# Patient Record
Sex: MALE | Race: WHITE | NOT HISPANIC OR LATINO | Employment: UNEMPLOYED | URBAN - NONMETROPOLITAN AREA
[De-identification: names, ages, dates, MRNs, and addresses within clinical notes are randomized per-mention and may not be internally consistent; named-entity substitution may affect disease eponyms.]

---

## 2024-08-21 ENCOUNTER — HOSPITAL ENCOUNTER (EMERGENCY)
Facility: HOSPITAL | Age: 29
Discharge: HOME | End: 2024-08-21
Attending: FAMILY MEDICINE
Payer: COMMERCIAL

## 2024-08-21 VITALS
HEART RATE: 97 BPM | RESPIRATION RATE: 18 BRPM | BODY MASS INDEX: 27.09 KG/M2 | SYSTOLIC BLOOD PRESSURE: 126 MMHG | OXYGEN SATURATION: 99 % | WEIGHT: 200 LBS | HEIGHT: 72 IN | DIASTOLIC BLOOD PRESSURE: 71 MMHG | TEMPERATURE: 98.4 F

## 2024-08-21 DIAGNOSIS — L08.9 DOG BITE OF LEFT HAND INCLUDING FINGERS WITH INFECTION, INITIAL ENCOUNTER: ICD-10-CM
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
## 2024-08-21 DIAGNOSIS — S61.452A DOG BITE OF LEFT HAND, INITIAL ENCOUNTER: Primary | ICD-10-CM

## 2024-08-21 DIAGNOSIS — S61.259A DOG BITE OF LEFT HAND INCLUDING FINGERS WITH INFECTION, INITIAL ENCOUNTER: ICD-10-CM
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
## 2024-08-21 DIAGNOSIS — S61.452A DOG BITE OF LEFT HAND INCLUDING FINGERS WITH INFECTION, INITIAL ENCOUNTER: ICD-10-CM

## 2024-08-21 PROCEDURE — 2500000001 HC RX 250 WO HCPCS SELF ADMINISTERED DRUGS (ALT 637 FOR MEDICARE OP): Performed by: FAMILY MEDICINE

## 2024-08-21 PROCEDURE — 99283 EMERGENCY DEPT VISIT LOW MDM: CPT

## 2024-08-21 RX ORDER — METRONIDAZOLE 500 MG/1
500 TABLET ORAL 3 TIMES DAILY
Qty: 21 TABLET | Refills: 0 | Status: SHIPPED | OUTPATIENT
Start: 2024-08-21 | End: 2024-08-28

## 2024-08-21 RX ORDER — CLINDAMYCIN HYDROCHLORIDE 300 MG/1
300 CAPSULE ORAL 3 TIMES DAILY
Qty: 30 CAPSULE | Refills: 0 | Status: SHIPPED | OUTPATIENT
Start: 2024-08-21 | End: 2024-08-31

## 2024-08-21 RX ORDER — DEXTROAMPHETAMINE SACCHARATE, AMPHETAMINE ASPARTATE MONOHYDRATE, DEXTROAMPHETAMINE SULFATE AND AMPHETAMINE SULFATE 3.75; 3.75; 3.75; 3.75 MG/1; MG/1; MG/1; MG/1
1 CAPSULE, EXTENDED RELEASE ORAL
COMMUNITY
Start: 2024-07-25

## 2024-08-21 RX ORDER — CLINDAMYCIN HYDROCHLORIDE 150 MG/1
300 CAPSULE ORAL ONCE
Status: COMPLETED | OUTPATIENT
Start: 2024-08-21 | End: 2024-08-21

## 2024-08-21 RX ORDER — METRONIDAZOLE 250 MG/1
500 TABLET ORAL ONCE
Status: COMPLETED | OUTPATIENT
Start: 2024-08-21 | End: 2024-08-21

## 2024-08-21 ASSESSMENT — PAIN - FUNCTIONAL ASSESSMENT: PAIN_FUNCTIONAL_ASSESSMENT: 0-10

## 2024-08-21 ASSESSMENT — PAIN DESCRIPTION - LOCATION: LOCATION: HAND

## 2024-08-21 ASSESSMENT — PAIN SCALES - GENERAL: PAINLEVEL_OUTOF10: 3

## 2024-08-21 ASSESSMENT — COLUMBIA-SUICIDE SEVERITY RATING SCALE - C-SSRS
2. HAVE YOU ACTUALLY HAD ANY THOUGHTS OF KILLING YOURSELF?: NO
1. IN THE PAST MONTH, HAVE YOU WISHED YOU WERE DEAD OR WISHED YOU COULD GO TO SLEEP AND NOT WAKE UP?: NO
6. HAVE YOU EVER DONE ANYTHING, STARTED TO DO ANYTHING, OR PREPARED TO DO ANYTHING TO END YOUR LIFE?: NO

## 2024-08-21 ASSESSMENT — PAIN DESCRIPTION - PAIN TYPE: TYPE: ACUTE PAIN

## 2024-08-22 NOTE — ED NOTES
Patient was bitten by his own dog on Saturday. 3 healed abrasions, puncture wounds present on left hand. 3/10 pain. Hand has swelling, redness and is hot covering most of the anterior part of the hand.     Lester Davis RN  08/21/24 4171

## 2024-08-22 NOTE — ED PROVIDER NOTES
HPI   Chief Complaint   Patient presents with    Animal Bite       HPI  This 29-year-old male patient came to the emergency room complaint of dog bite to left hand with redness swelling and actually more redness in the dorsal hand than swelling with 2 puncture spots dog belonged to his wife the dog shots are up-to-date.  They have multiple dogs in the house.  Patient stated that his tetanus shot is up-to-date.  Dog bite occurred 5 days ago and due to redness and slight swelling dorsal hand with marked erythema without any difficulty moving his finger he decided come to but no difficulty moving finger.  Distal erythema and infection in the skin decided come to ER for evaluation tonight.  Denies any fever chills cough nausea vomiting diarrhea.  Denies weakness handgrip or to move his finger denies any elbow or wrist pain or swelling of the joint in the elbow wrist or fingers.  He is he denies any nausea vomiting or diarrhea.  Patient is right-handed.      Family history: Reviewed  Social history: Reviewed, denies substance abuse.  Review of system: 10 review of system obtained review of system described in HPI otherwise negative.      Patient History   Past Medical History:   Diagnosis Date    Chlamydial infection, unspecified 10/24/2013    Disease due to chlamydiae    Encounter for screening for lipoid disorders 09/16/2013    Encounter for screening for lipoid disorders    Noninfective gastroenteritis and colitis, unspecified 09/16/2013    Chronic diarrhea of unknown origin     History reviewed. No pertinent surgical history.  No family history on file.  Social History     Tobacco Use    Smoking status: Never    Smokeless tobacco: Never   Substance Use Topics    Alcohol use: Not on file    Drug use: Not on file       Physical Exam   ED Triage Vitals [08/21/24 2233]   Temperature Heart Rate Respirations BP   37 °C (98.6 °F) 98 18 136/74      SpO2 Temp src Heart Rate Source Patient Position   98 % -- -- --      BP  Location FiO2 (%)     -- --       Physical Exam  Constitutional:       Appearance: Normal appearance.      Comments: Marked erythema noted to dorsal left hand with some extending from the metacarpal to the wrist area without involving the DIP joint or MP joint.  Able to flex and extend all 5 digits good capillary refill to functioning intact sensation.  Intact radial pulses.  No red streaking forearm 2 puncture spot noted on dorsal hand.  No abscess or drainage no significant edema was noted to roby erythema dorsum of left hand.  Intact radial pulse intact sensation.    No facial bruise edema erythema head was normocephalic atraumatic talking breathing comfortably patient did not look sick toxic distress.  Alert oriented x 3.   HENT:      Head: Normocephalic and atraumatic.      Right Ear: Ear canal and external ear normal.      Left Ear: Ear canal and external ear normal.      Nose: Nose normal. No congestion or rhinorrhea.      Mouth/Throat:      Mouth: Mucous membranes are moist.   Eyes:      Extraocular Movements: Extraocular movements intact.      Conjunctiva/sclera: Conjunctivae normal.      Pupils: Pupils are equal, round, and reactive to light.   Cardiovascular:      Rate and Rhythm: Normal rate and regular rhythm.      Pulses: Normal pulses.      Heart sounds: Normal heart sounds.      Comments: No tachycardia regular rate rhythm.  No friction rub no murmur no JVD good capillary refill at tip of finger including involved hand intact radial pulses.  Calf muscle nontender Homans' sign negative bilaterally.  Pulmonary:      Effort: Pulmonary effort is normal.      Breath sounds: Normal breath sounds.      Comments: Chest wall nontender clear lung sounds bilaterally with no wheezing rales rhonchi no tachypnea hypoxemia respite distress.  Good skin perfusion  Abdominal:      General: Abdomen is flat. Bowel sounds are normal.      Palpations: Abdomen is soft.      Comments: Abdomen soft positive bowel sound  nontender no guarding rebound rigidity.  No CVA tenderness noted.  Spine nontender neck is supple.   Musculoskeletal:      Cervical back: Normal range of motion and neck supple.      Comments: Marked erythema noted to dorsal left hand with some extending from the metacarpal to the wrist area without involving the DIP joint or MP joint.  Able to flex and extend all 5 digits good capillary refill to functioning intact sensation.  Intact radial pulses.  No red streaking forearm 2 puncture spot noted on dorsal hand.  No abscess or drainage no significant edema was noted to roby erythema dorsum of left hand.  Intact radial pulse intact sensation.  Good range of motion arms alert good motor strength both upper lower extremity good radial pulses intact sensation neck is supple spine nontender no facial bruise edema Manda or tenderness noted.    No facial bruise edema erythema head was normocephalic atraumatic talking breathing comfortably patient did not look sick toxic distress.  Alert oriented x 3.   Skin:     General: Skin is warm.      Capillary Refill: Capillary refill takes less than 2 seconds.      Comments: Marked erythema noted to dorsal left hand with some extending from the metacarpal to the wrist area without involving the DIP joint or MP joint.  Able to flex and extend all 5 digits good capillary refill to functioning intact sensation.  Intact radial pulses.  No red streaking forearm 2 puncture spot noted on dorsal hand.  No abscess or drainage no significant edema was noted to roby erythema dorsum of left hand.  Intact radial pulse intact sensation.  Good range of motion arms alert good motor strength both upper lower extremity good radial pulses intact sensation neck is supple spine nontender no facial bruise edema Manda or tenderness noted.    No facial bruise edema erythema head was normocephalic atraumatic talking breathing comfortably patient did not look sick toxic distress.  Alert oriented x 3.    Neurological:      General: No focal deficit present.      Mental Status: He is alert and oriented to person, place, and time.   Psychiatric:         Mood and Affect: Mood normal.         Behavior: Behavior normal.           ED Course & MDM   Diagnoses as of 09/07/24 0145   Dog bite of left hand, initial encounter   Dog bite of left hand including fingers with infection, initial encounter   This 29-year-old male patient came to the emergency room complaint of dog bite to left hand with redness swelling and actually more redness in the dorsal hand than swelling with 2 puncture spots dog belonged to his wife the dog shots are up-to-date.  They have multiple dogs in the house.  Patient stated that his tetanus shot is up-to-date.  Dog bite occurred 5 days ago and due to redness and slight swelling dorsal hand with marked erythema without any difficulty moving his finger he decided come to but no difficulty moving finger.  Distal erythema and infection in the skin decided come to ER for evaluation tonight.  Denies any fever chills cough nausea vomiting diarrhea.  Denies weakness handgrip or to move his finger denies any elbow or wrist pain or swelling of the joint in the elbow wrist or fingers.  He is he denies any nausea vomiting or diarrhea.  Patient is right-handed.     Marked erythema noted to dorsal left hand with some extending from the metacarpal to the wrist area without involving the DIP joint or MP joint.  Able to flex and extend all 5 digits good capillary refill to functioning intact sensation.  Intact radial pulses.  No red streaking forearm 2 puncture spot noted on dorsal hand.  No abscess or drainage no significant edema was noted to roby erythema dorsum of left hand.  Intact radial pulse intact sensation.    No facial bruise edema erythema head was normocephalic atraumatic talking breathing comfortably patient did not look sick toxic distress.  Alert oriented x 3.         No data recorded                                  Medical Decision Making    Due to patient's significant infection I discussed treatment option the patient he did not want to have workup done he want to be treated immediately he is allergic to penicillin as result he was put on clindamycin and metronidazole next regiment of recommended treatment with close follow-up.  If any problem concern return to ER.  Patient understood instruction well also understood risk and benefit well problems return to ER if any problem concern close follow-up as recommended.  Procedure  Procedures     Yani Briggs MD  08/21/24 2321       Yani Briggs MD  08/21/24 2325       Yani Briggs MD  09/07/24 0149

## 2024-08-22 NOTE — DISCHARGE INSTRUCTIONS
As discussed if any worsening of symptoms swelling increased redness stiffness of finger report to [facility] emergency room immediately for evaluation by hand specialist and infectious disease specialist.  Since you are allergic to redness penicillin you been put on second line medicine of clindamycin and Flagyl patient to take medicine as prescribed.  You need to be reevaluated in 48 hours.  See your primary care physician or return to ER for evaluation.  Once again if any worsening symptoms report to ER at [facility] Main treatment [facility] immediately.

## 2025-02-22 ENCOUNTER — APPOINTMENT (OUTPATIENT)
Dept: RADIOLOGY | Facility: HOSPITAL | Age: 30
End: 2025-02-22
Payer: COMMERCIAL

## 2025-02-22 ENCOUNTER — HOSPITAL ENCOUNTER (EMERGENCY)
Facility: HOSPITAL | Age: 30
Discharge: HOME | End: 2025-02-23
Attending: STUDENT IN AN ORGANIZED HEALTH CARE EDUCATION/TRAINING PROGRAM
Payer: COMMERCIAL

## 2025-02-22 VITALS
OXYGEN SATURATION: 98 % | RESPIRATION RATE: 16 BRPM | TEMPERATURE: 97.9 F | HEIGHT: 72 IN | SYSTOLIC BLOOD PRESSURE: 127 MMHG | WEIGHT: 185.63 LBS | HEART RATE: 98 BPM | DIASTOLIC BLOOD PRESSURE: 78 MMHG | BODY MASS INDEX: 25.14 KG/M2

## 2025-02-22 DIAGNOSIS — I86.1 LEFT VARICOCELE: ICD-10-CM

## 2025-02-22 DIAGNOSIS — N50.812 LEFT TESTICULAR PAIN: Primary | ICD-10-CM

## 2025-02-22 LAB
AMORPH CRY #/AREA UR COMP ASSIST: NORMAL /HPF
APPEARANCE UR: ABNORMAL
BILIRUB UR STRIP.AUTO-MCNC: NEGATIVE MG/DL
COLOR UR: ABNORMAL
GLUCOSE UR STRIP.AUTO-MCNC: NORMAL MG/DL
KETONES UR STRIP.AUTO-MCNC: NEGATIVE MG/DL
LEUKOCYTE ESTERASE UR QL STRIP.AUTO: NEGATIVE
MUCOUS THREADS #/AREA URNS AUTO: NORMAL /LPF
NITRITE UR QL STRIP.AUTO: NEGATIVE
PH UR STRIP.AUTO: 6 [PH]
PROT UR STRIP.AUTO-MCNC: ABNORMAL MG/DL
RBC # UR STRIP.AUTO: NEGATIVE MG/DL
RBC #/AREA URNS AUTO: NORMAL /HPF
SP GR UR STRIP.AUTO: 1.03
UROBILINOGEN UR STRIP.AUTO-MCNC: ABNORMAL MG/DL
WBC #/AREA URNS AUTO: NORMAL /HPF

## 2025-02-22 PROCEDURE — 76870 US EXAM SCROTUM: CPT | Performed by: SURGERY

## 2025-02-22 PROCEDURE — 99284 EMERGENCY DEPT VISIT MOD MDM: CPT | Mod: 25 | Performed by: STUDENT IN AN ORGANIZED HEALTH CARE EDUCATION/TRAINING PROGRAM

## 2025-02-22 PROCEDURE — 81001 URINALYSIS AUTO W/SCOPE: CPT | Performed by: STUDENT IN AN ORGANIZED HEALTH CARE EDUCATION/TRAINING PROGRAM

## 2025-02-22 PROCEDURE — 76870 US EXAM SCROTUM: CPT

## 2025-02-22 ASSESSMENT — COLUMBIA-SUICIDE SEVERITY RATING SCALE - C-SSRS
6. HAVE YOU EVER DONE ANYTHING, STARTED TO DO ANYTHING, OR PREPARED TO DO ANYTHING TO END YOUR LIFE?: NO
1. IN THE PAST MONTH, HAVE YOU WISHED YOU WERE DEAD OR WISHED YOU COULD GO TO SLEEP AND NOT WAKE UP?: NO
2. HAVE YOU ACTUALLY HAD ANY THOUGHTS OF KILLING YOURSELF?: NO

## 2025-02-22 ASSESSMENT — PAIN DESCRIPTION - DESCRIPTORS: DESCRIPTORS: DISCOMFORT

## 2025-02-22 ASSESSMENT — PAIN DESCRIPTION - PAIN TYPE: TYPE: ACUTE PAIN

## 2025-02-22 ASSESSMENT — PAIN DESCRIPTION - LOCATION: LOCATION: GROIN

## 2025-02-22 ASSESSMENT — PAIN DESCRIPTION - FREQUENCY: FREQUENCY: CONSTANT/CONTINUOUS

## 2025-02-22 ASSESSMENT — PAIN - FUNCTIONAL ASSESSMENT: PAIN_FUNCTIONAL_ASSESSMENT: 0-10

## 2025-02-22 ASSESSMENT — PAIN SCALES - GENERAL: PAINLEVEL_OUTOF10: 2

## 2025-02-22 ASSESSMENT — PAIN DESCRIPTION - ONSET: ONSET: SUDDEN

## 2025-02-22 ASSESSMENT — PAIN DESCRIPTION - PROGRESSION: CLINICAL_PROGRESSION: NOT CHANGED

## 2025-02-23 PROCEDURE — 2500000004 HC RX 250 GENERAL PHARMACY W/ HCPCS (ALT 636 FOR OP/ED): Performed by: STUDENT IN AN ORGANIZED HEALTH CARE EDUCATION/TRAINING PROGRAM

## 2025-02-23 PROCEDURE — 96372 THER/PROPH/DIAG INJ SC/IM: CPT | Performed by: STUDENT IN AN ORGANIZED HEALTH CARE EDUCATION/TRAINING PROGRAM

## 2025-02-23 RX ORDER — KETOROLAC TROMETHAMINE 30 MG/ML
30 INJECTION, SOLUTION INTRAMUSCULAR; INTRAVENOUS ONCE
Status: COMPLETED | OUTPATIENT
Start: 2025-02-23 | End: 2025-02-23

## 2025-02-23 RX ORDER — KETOROLAC TROMETHAMINE 10 MG/1
10 TABLET, FILM COATED ORAL EVERY 6 HOURS PRN
Qty: 20 TABLET | Refills: 0 | Status: SHIPPED | OUTPATIENT
Start: 2025-02-23 | End: 2025-02-28

## 2025-02-23 RX ADMIN — KETOROLAC TROMETHAMINE 30 MG: 30 INJECTION, SOLUTION INTRAMUSCULAR at 01:04

## 2025-02-23 NOTE — DISCHARGE INSTRUCTIONS
Follow-up with urology Dr. Messina using the information provided to schedule an appointment with his office regarding varicocele found on your ultrasound today.  There is no evidence of any infection or anything that would need an emergent surgery for further management, you can use Toradol and Tylenol as needed for pain control, wear more supportive underwear which can help to reduce tension to the area as well give some relief.  Return to the ED for any new or worsening symptoms including severe worsening of pain, fevers, spreading redness around the skin to the affected area which could be signs of local infection and require antibiotic treatment.

## 2025-02-24 NOTE — ED PROVIDER NOTES
"HPI   Chief Complaint   Patient presents with    Groin Pain     Pt started to have some lower abdominal/groin pain after \"moving wrong\" at work a few days ago. Pt also having some pain in his left testicle.       This is a 30-year-old male presenting the ED for evaluation of left groin and testicular pain.  He states that the symptoms started after \"moving wrong\" at work a few days ago.  He feels that there may be a slight bulge at the left testicle when at the groin just above this area, he is never noticed the symptoms in the past.  He denies any penile discharge, dysuria or hematuria, difficulty urinating.  He has 1 sexual partner and his wife, denies any concern for new partners or STI exposure.  He denies any rash or penile, scrotal lesions.      History provided by:  Patient   used: No            Patient History   Past Medical History:   Diagnosis Date    Chlamydial infection, unspecified 10/24/2013    Disease due to chlamydiae    Encounter for screening for lipoid disorders 09/16/2013    Encounter for screening for lipoid disorders    Noninfective gastroenteritis and colitis, unspecified 09/16/2013    Chronic diarrhea of unknown origin     History reviewed. No pertinent surgical history.  No family history on file.  Social History     Tobacco Use    Smoking status: Never    Smokeless tobacco: Never   Substance Use Topics    Alcohol use: Not on file    Drug use: Not on file       Physical Exam   ED Triage Vitals [02/22/25 2254]   Temperature Heart Rate Respirations BP   36.6 °C (97.9 °F) 98 16 127/78      Pulse Ox Temp Source Heart Rate Source Patient Position   98 % Temporal Monitor Sitting      BP Location FiO2 (%)     Right arm --       Physical Exam  General: well developed, well nourished adult male who is awake and alert, in no apparent distress  CV: regular rate and rhythm, no murmur, no gallops, or rubs.   Resp: clear to ascultation bilaterally, no wheezes, rales, or rhonchi  GI: " abdomen soft, nontender without rigidity or guarding, no peritoneal signs, abdomen is nondistended, no masses palpated  : No genital lesions, mildly tender to palpation over the left testicle, no epididymal tenderness, no palpable inguinal hernia present  Psych: appropriate mood and affect, cooperative with exam  Skin: warm, dry, without evidence of rash or abrasions    ED Course & MDM   Diagnoses as of 02/24/25 0349   Left testicular pain   Left varicocele                 No data recorded                                 Medical Decision Making  He is in no acute distress here, he presents for left testicular and groin pain for the past few days.  Urinalysis shows no UTI, ultrasound of the testicles was performed in the ED showing good blood flow, no evidence of testicular torsion.  He does have a left varicocele present, no hydrocele or visible hernia.  He has no palpable hernia on exam.  No external penile lesions or scrotal lesions.  No symptoms suggestive of STI and no concern for exposure necessitating further testing.  He was treated with Toradol with some improvement of his pain.  He was referred to a urologist for further management of varicocele as an outpatient.  He was discharged in improved condition.    US scrotum w doppler   Final Result   Left-sided varicocele.There is distention of the pampiniform plexus   with Valsalva in the right hemiscrotum but no definite varicocele.        Normal testes and epididymides.        MACRO:   None        Signed by: Michael Oneal 2/23/2025 12:56 AM   Dictation workstation:   [id number]        Labs Reviewed   URINALYSIS WITH REFLEX CULTURE AND MICROSCOPIC - Abnormal       Result Value    Color, Urine Light-Orange (*)     Appearance, Urine Turbid (*)     Specific Gravity, Urine 1.029      pH, Urine 6.0      Protein, Urine 10 (TRACE)      Glucose, Urine Normal      Blood, Urine NEGATIVE      Ketones, Urine NEGATIVE      Bilirubin, Urine NEGATIVE      Urobilinogen, Urine  2 (1+) (*)     Nitrite, Urine NEGATIVE      Leukocyte Esterase, Urine NEGATIVE     URINALYSIS WITH REFLEX CULTURE AND MICROSCOPIC    Narrative:     The following orders were created for panel order Urinalysis with Reflex Culture and Microscopic.  Procedure                               Abnormality         Status                     ---------                               -----------         ------                     Urinalysis with Reflex C...[[id number]]  Abnormal            Final result               Extra Urine Gray Tube[[id number]]                                                         Please view results for these tests on the individual orders.   EXTRA URINE GRAY TUBE   URINALYSIS MICROSCOPIC WITH REFLEX CULTURE    WBC, Urine 1-5      RBC, Urine 1-2      Mucus, Urine FEW      Amorphous Crystals, Urine 1+           Procedure  Procedures     Asher Pelayo DO  02/24/25 2615

## 2025-03-19 ENCOUNTER — HOSPITAL ENCOUNTER (EMERGENCY)
Facility: HOSPITAL | Age: 30
Discharge: HOME | End: 2025-03-19
Attending: FAMILY MEDICINE
Payer: COMMERCIAL

## 2025-03-19 VITALS
SYSTOLIC BLOOD PRESSURE: 144 MMHG | BODY MASS INDEX: 25.44 KG/M2 | HEART RATE: 87 BPM | DIASTOLIC BLOOD PRESSURE: 84 MMHG | TEMPERATURE: 98 F | OXYGEN SATURATION: 99 % | WEIGHT: 187.83 LBS | HEIGHT: 72 IN | RESPIRATION RATE: 17 BRPM

## 2025-03-19 DIAGNOSIS — R21 GROIN RASH: Primary | ICD-10-CM

## 2025-03-19 DIAGNOSIS — L25.9 SHAVING IRRITATION: ICD-10-CM

## 2025-03-19 PROCEDURE — 99283 EMERGENCY DEPT VISIT LOW MDM: CPT | Performed by: FAMILY MEDICINE

## 2025-03-19 PROCEDURE — 99282 EMERGENCY DEPT VISIT SF MDM: CPT

## 2025-03-19 RX ORDER — HYDROCORTISONE 25 MG/G
CREAM TOPICAL 2 TIMES DAILY
Qty: 20 G | Refills: 0 | Status: SHIPPED | OUTPATIENT
Start: 2025-03-19 | End: 2025-03-26

## 2025-03-19 ASSESSMENT — PAIN SCALES - GENERAL
PAINLEVEL_OUTOF10: 1
PAINLEVEL_OUTOF10: 1

## 2025-03-19 ASSESSMENT — PAIN DESCRIPTION - PROGRESSION: CLINICAL_PROGRESSION: NOT CHANGED

## 2025-03-19 ASSESSMENT — COLUMBIA-SUICIDE SEVERITY RATING SCALE - C-SSRS
6. HAVE YOU EVER DONE ANYTHING, STARTED TO DO ANYTHING, OR PREPARED TO DO ANYTHING TO END YOUR LIFE?: NO
2. HAVE YOU ACTUALLY HAD ANY THOUGHTS OF KILLING YOURSELF?: NO
1. IN THE PAST MONTH, HAVE YOU WISHED YOU WERE DEAD OR WISHED YOU COULD GO TO SLEEP AND NOT WAKE UP?: NO

## 2025-03-19 ASSESSMENT — PAIN - FUNCTIONAL ASSESSMENT: PAIN_FUNCTIONAL_ASSESSMENT: 0-10

## 2025-03-19 ASSESSMENT — PAIN DESCRIPTION - LOCATION: LOCATION: GROIN

## 2025-03-19 ASSESSMENT — PAIN DESCRIPTION - ORIENTATION: ORIENTATION: RIGHT

## 2025-03-19 NOTE — ED PROVIDER NOTES
HPI   Chief Complaint   Patient presents with    Groin Pain       30-year-old male comes the ED with complaint of itchiness across his belly that he noted earlier today.  Patient reports he was lowered down by his groin on both sides.  Patient reports that it is not painful and just itches at that area and not sure where it came from but did not have it evaluated by his primary care doctor and came to the ED to assess it.  Patient in the ED is alert, cooperative, appears comfortable, and in no distress.  Patient reports no other associate symptoms or complaints at this time.      History provided by:  Medical records and patient   used: No            Patient History   Past Medical History:   Diagnosis Date    Chlamydial infection, unspecified 10/24/2013    Disease due to chlamydiae    Encounter for screening for lipoid disorders 09/16/2013    Encounter for screening for lipoid disorders    Noninfective gastroenteritis and colitis, unspecified 09/16/2013    Chronic diarrhea of unknown origin     History reviewed. No pertinent surgical history.  No family history on file.  Social History     Tobacco Use    Smoking status: Never    Smokeless tobacco: Never   Vaping Use    Vaping status: Every Day   Substance Use Topics    Alcohol use: Not on file    Drug use: Never       Physical Exam   ED Triage Vitals [03/19/25 0154]   Temperature Heart Rate Respirations BP   36.4 °C (97.5 °F) 91 18 144/84      SpO2 Temp src Heart Rate Source Patient Position   98 % -- -- --      BP Location FiO2 (%)     -- --       Physical Exam  Vitals and nursing note reviewed.   Constitutional:       General: He is not in acute distress.     Appearance: He is well-developed.   HENT:      Head: Normocephalic and atraumatic.   Eyes:      Conjunctiva/sclera: Conjunctivae normal.   Cardiovascular:      Rate and Rhythm: Normal rate and regular rhythm.      Pulses: Normal pulses.      Heart sounds: Normal heart sounds, S1 normal and  S2 normal. No murmur heard.  Pulmonary:      Effort: Pulmonary effort is normal. No respiratory distress.      Breath sounds: Normal breath sounds.   Abdominal:      General: Abdomen is flat.      Palpations: Abdomen is soft.      Tenderness: There is no abdominal tenderness.   Musculoskeletal:         General: No swelling.      Cervical back: Neck supple.   Skin:     General: Skin is warm and dry.      Capillary Refill: Capillary refill takes less than 2 seconds.      Findings: Rash present. Rash is macular and papular.             Comments: 2 small area patches of skin near the groin area appear to be maculopapular rash adjacent to an area where patient had shaved that appears to be consistent with a skin irritation/razor burn   Neurological:      Mental Status: He is alert.   Psychiatric:         Mood and Affect: Mood normal.           ED Course & MDM   Diagnoses as of 03/19/25 0647   Groin rash   Shaving irritation                 No data recorded     [place] Coma Scale Score: 15 (03/19/25 0155 : Leslie Crenshaw RN)                           Medical Decision Making  Pt upon arrival to the ED appeared to be comfortable and in no distress with stable vitals.  Discussed with pt the presenting complaints and clinically findings.  Reviewed with pt the epic chart and counseled the patient on rashes and appropriate approach to management/treatments.  After assessment and evaluation findings of his exam appear to be consistent with a skin rash secondary to possible razor burn foot area that patient shaved down by his groin.  Recommendations were made to the patient regarding management and patient is given prescription for home and educated in its appropriate use.  Patient was encouraged to contact his primary care doctor follow-up in neck several days for recheck for resolution of the rash.  Patient stable to time and discharged home.    Amount and/or Complexity of Data Reviewed  External Data Reviewed: labs, radiology  and notes.    Risk  OTC drugs.  Prescription drug management.        Procedure  Procedures     Mic Teran MD  03/19/25 0649

## 2025-03-19 NOTE — ED TRIAGE NOTES
Pt to ED for reports of right lower abd/groin pain and redness since yesterday. States he noticed it after he got out of the shower after the gym and states he also has a physical job.

## 2025-03-21 ENCOUNTER — OFFICE VISIT (OUTPATIENT)
Dept: URGENT CARE | Facility: URGENT CARE | Age: 30
End: 2025-03-21
Payer: COMMERCIAL

## 2025-03-21 VITALS
HEIGHT: 72 IN | DIASTOLIC BLOOD PRESSURE: 82 MMHG | OXYGEN SATURATION: 96 % | SYSTOLIC BLOOD PRESSURE: 129 MMHG | RESPIRATION RATE: 16 BRPM | BODY MASS INDEX: 25.08 KG/M2 | HEART RATE: 97 BPM | TEMPERATURE: 98.1 F | WEIGHT: 185.19 LBS

## 2025-03-21 DIAGNOSIS — I86.1 VARICOCELE: ICD-10-CM

## 2025-03-21 DIAGNOSIS — N41.0 ACUTE PROSTATITIS: ICD-10-CM

## 2025-03-21 DIAGNOSIS — R30.0 BURNING WITH URINATION: Primary | ICD-10-CM

## 2025-03-21 DIAGNOSIS — Z11.3 SCREEN FOR STD (SEXUALLY TRANSMITTED DISEASE): ICD-10-CM

## 2025-03-21 LAB
POC APPEARANCE, URINE: CLEAR
POC BILIRUBIN, URINE: NEGATIVE
POC BLOOD, URINE: NEGATIVE
POC COLOR, URINE: YELLOW
POC GLUCOSE, URINE: NEGATIVE MG/DL
POC KETONES, URINE: NEGATIVE MG/DL
POC LEUKOCYTES, URINE: NEGATIVE
POC NITRITE,URINE: NEGATIVE
POC PH, URINE: 7 PH
POC PROTEIN, URINE: NEGATIVE MG/DL
POC SPECIFIC GRAVITY, URINE: 1.01
POC UROBILINOGEN, URINE: 0.2 EU/DL

## 2025-03-21 RX ORDER — TADALAFIL 5 MG/1
5 TABLET ORAL
COMMUNITY
Start: 2025-03-18 | End: 2025-04-17

## 2025-03-21 RX ORDER — CIPROFLOXACIN 500 MG/1
500 TABLET ORAL 2 TIMES DAILY
Qty: 20 TABLET | Refills: 0 | Status: SHIPPED | OUTPATIENT
Start: 2025-03-21 | End: 2025-03-31

## 2025-03-21 RX ORDER — TALC
6 POWDER (GRAM) TOPICAL DAILY PRN
COMMUNITY
Start: 2025-01-11

## 2025-03-21 RX ORDER — DEXTROAMPHETAMINE SACCHARATE, AMPHETAMINE ASPARTATE MONOHYDRATE, DEXTROAMPHETAMINE SULFATE AND AMPHETAMINE SULFATE 5; 5; 5; 5 MG/1; MG/1; MG/1; MG/1
1 CAPSULE, EXTENDED RELEASE ORAL
COMMUNITY
Start: 2025-02-19

## 2025-03-21 NOTE — PROGRESS NOTES
Subjective   Patient ID: Nathan Roa is a 30 y.o. male present today with a chief complaint of Groin Pain (Left groin pain, 3 days) and Urinary Problem (Occasional burning with urination, white coming out of penis when urinating occasionally ).    History of Present Illness    Groin Pain    Pt reports he was seen in local ER 2 days ago for similar issues. He had testicular ultrasound that showed varicocele and normal genital exam. Pt is sexually active, one female partner his wife, no risk for STDs. Last STD screen over a year ago prior to becoming sexually active with his wife. Pt had chlamydia infection in high school. No fever or nausea or vomiting. Intermittent diarrhea, nothing recent. Pt is taking cialis due to side effects of adderall.  Pt reports brother with testicular cancer around 30 years of age. Pt admits to vaping nicotine and drinking coffee and protein shakes. He tries to stay hydrated with drinking water throughout the day.     Past Medical History  Allergies as of 03/21/2025 - Reviewed 03/21/2025   Allergen Reaction Noted    Penicillins Rash and Hives 12/31/2013       (Not in a hospital admission)       Past Medical History:   Diagnosis Date    Chlamydial infection, unspecified 10/24/2013    Disease due to chlamydiae    Encounter for screening for lipoid disorders 09/16/2013    Encounter for screening for lipoid disorders    Noninfective gastroenteritis and colitis, unspecified 09/16/2013    Chronic diarrhea of unknown origin       History reviewed. No pertinent surgical history.     reports that he has never smoked. He has never used smokeless tobacco. He reports that he does not currently use alcohol. He reports that he does not use drugs.    Review of Systems  Review of Systems                               Objective    Vitals:    03/21/25 1113   BP: 129/82   Pulse: 97   Resp: 16   Temp: 36.7 °C (98.1 °F)   TempSrc: Oral   SpO2: 96%   Weight: 84 kg (185 lb 3 oz)   Height: 1.829 m (6')     No  LMP for male patient.    Physical Exam  Constitutional:       Appearance: Normal appearance.      Comments: anxious   Cardiovascular:      Rate and Rhythm: Normal rate and regular rhythm.      Heart sounds: No murmur heard.  Pulmonary:      Effort: Pulmonary effort is normal. No respiratory distress.      Breath sounds: No wheezing or rhonchi.   Abdominal:      General: Abdomen is flat. Bowel sounds are normal. There is no distension.      Palpations: Abdomen is soft.      Tenderness: There is no abdominal tenderness. There is no right CVA tenderness, left CVA tenderness or guarding.      Comments: Tiny mobile nodule epigastric area and left flank   Genitourinary:     Comments: declined  Musculoskeletal:         General: Normal range of motion.   Skin:     General: Skin is warm and dry.      Findings: No rash.   Neurological:      General: No focal deficit present.      Mental Status: He is alert.      Motor: No weakness.   Psychiatric:      Comments: Anxious rapid speech and pacing         Procedures    Point of Care Test & Imaging Results from this visit  Results for orders placed or performed in visit on 03/21/25   POCT UA Automated manually resulted   Result Value Ref Range    POC Color, Urine Yellow Straw, Yellow, Light-Yellow    POC Appearance, Urine Clear Clear    POC Glucose, Urine NEGATIVE NEGATIVE mg/dl    POC Bilirubin, Urine NEGATIVE NEGATIVE    POC Ketones, Urine NEGATIVE NEGATIVE mg/dl    POC Specific Gravity, Urine 1.010 1.005 - 1.035    POC Blood, Urine NEGATIVE NEGATIVE    POC PH, Urine 7.0 No Reference Range Established PH    POC Protein, Urine NEGATIVE NEGATIVE mg/dl    POC Urobilinogen, Urine 0.2 0.2, 1.0 EU/DL    Poc Nitrite, Urine NEGATIVE NEGATIVE    POC Leukocytes, Urine NEGATIVE NEGATIVE      No results found.    Diagnostic study results (if any) were reviewed by Naomi Schuster PA-C.    Assessment/Plan   Allergies, medications, history, and pertinent labs/EKGs/Imaging reviewed by  Naomi Schuster PA-C.     Medical Decision Making  30 y.o. male present today with a chief complaint of Groin Pain (Left groin pain, 3 days) and Urinary Problem (Occasional burning with urination, white coming out of penis when urinating occasionally ).  Reviewed vitals stable. Exam remarkable for no abdominal tenderness, no guarding, no CVA ttp, declined genital exam.    Discussed with pt Acute dysuria multifactorial- urinalysis negative. Urine culture sent. Encouraged hydration. Smoking cessation. Avoid bladder irritants such as caffeine, nicotine, alcohol, spicy foods. STD screening sent for chlamydia, gonorrhea, trich via clean catch urine. Advised to abstain from intercourse until final report. If positive will need to return for treatment and last sex partner will need to be treated.     Suspected prostatitis- Start Cipro 500mg twice a day x 10 days; take with food and probiotic (culturelle).     Varicocele on ultrasound- referral to Urology for further workup and treatment of dysuria, suspected prostatitis and varicocele.  If you do not receive a call in next 24 hours, Please contact  by calling [phone] .    central scheduling hours:  Monday-Friday 7am- 7pm  Saturday & Sunday 8am- 5pm.    GO to ER if severe abdominal pain, or vomiting or fever or pressure in rectum to r/o abdominal infection.  Orders and Diagnoses  Diagnoses and all orders for this visit:  Burning with urination  -     POCT UA Automated manually resulted  -     Urine Culture  -     ciprofloxacin (Cipro) 500 mg tablet; Take 1 tablet (500 mg) by mouth 2 times a day for 10 days.  -     Referral to Urology; Future  Acute prostatitis  -     ciprofloxacin (Cipro) 500 mg tablet; Take 1 tablet (500 mg) by mouth 2 times a day for 10 days.  -     Referral to Urology; Future  Screen for STD (sexually transmitted disease)  -     C. trachomatis / N. gonorrhoeae, Amplified, Urogenital  -     Trichomonas vaginalis, Amplified  -      Referral to Urology; Future  Varicocele  -     Referral to Urology; Future      Medical Admin Record      Patient disposition: Home    Electronically signed by Naomi Schuster PA-C  12:03 PM

## 2025-03-21 NOTE — PATIENT INSTRUCTIONS
Acute dysuria- urinalysis negative. Urine culture sent. Encouraged hydration.    Suspected prostatitis- Start Cipro 500mg twice a day x 10 days; take with food and probiotic (culturelle).     Varicocele on ultrasound- referral to Urology for further workup and treatment of dysuria, suspected prostatitis and varicocele.  If you do not receive a call in next 24 hours, Please contact  by calling [phone] .    central scheduling hours:  Monday-Friday 7am- 7pm  Saturday & Sunday 8am- 5pm.    GO to ER if severe abdominal pain, or vomiting or fever or pressure in rectum to r/o abdominal infection.

## 2025-03-22 LAB
C TRACH RRNA SPEC QL NAA+PROBE: NOT DETECTED
N GONORRHOEA RRNA SPEC QL NAA+PROBE: NOT DETECTED
QUEST GC CT AMPLIFIED (ALWAYS MESSAGE): NORMAL
T VAGINALIS RRNA SPEC QL NAA+PROBE: NOT DETECTED

## 2025-03-24 LAB — BACTERIA UR CULT: NORMAL
